# Patient Record
Sex: FEMALE | Race: WHITE | URBAN - METROPOLITAN AREA
[De-identification: names, ages, dates, MRNs, and addresses within clinical notes are randomized per-mention and may not be internally consistent; named-entity substitution may affect disease eponyms.]

---

## 2024-04-19 ENCOUNTER — TELEPHONE (OUTPATIENT)
Age: 59
End: 2024-04-19

## 2024-04-19 NOTE — TELEPHONE ENCOUNTER
Patient call to see if referral was received advise we had not received an referral and she may want to reach back out to the referring provider's office to have information faxed over pt verbally understood. Sdh

## 2024-05-13 ENCOUNTER — TELEPHONE (OUTPATIENT)
Age: 59
End: 2024-05-13

## 2024-05-13 NOTE — TELEPHONE ENCOUNTER
Unable to contact patient to schedule NP saad with NP elizabeth Garcia to schedule next available NP saad

## 2024-09-15 ASSESSMENT — RHEUMATOLOGY NEW PATIENT QUESTIONNAIRE
SKIN REDNESS: N
RASH: N
ANEMIA: N
EASILY LOSING TEMPER: N
DIFFICULTY BREATHING LYING DOWN: N
PERSISTENT DIARRHEA: N
LOSS OF CONSCIOUSNESS: N
ANXIETY: N
SORES IN MOUTH OR NOSE: N
NUMBNESS OR TINGLING IN HANDS OR FEET: Y
BLACK STOOLS: N
JOINT PAIN: Y
ABNORMAL URINE: N
JAUNDICE: N
HEARTBURN OR REFLUX: Y
SHORTNESS OF BREATH: Y
EYE REDNESS: N
FAINTING: N
FEVER: N
DIFFICULTY STAYING ASLEEP: Y
VAGINAL DRYNESS: N
BEHAVIORAL CHANGES: N
SWOLLEN OR TENDER GLANDS: N
UNUSUAL BLEEDING: N
SKIN TIGHTNESS: N
UNUSUAL FATIGUE: N
HOARSE VOICE: N
UNEXPLAINED HEARING LOSS: N
DRYNESS OF MOUTH: N
DEPRESSION: N
LOSS OF VISION: N
DIFFICULTY FALLING ASLEEP: Y
NODULES/BUMPS: N
HEADACHES: N
MUSCLE WEAKNESS: N
STOMACH PAIN: N
EXCESSIVE HAIR LOSS (MORE THAN YOUR NORM): N
MORNING STIFFNESS: N
EYE PAIN: N
UNUSUALLY RAPID OR SLOWED HEART RATE: N
EYE DRYNESS: N
AGITATION: N
SEIZURES: N
SUN SENSITIVE (SUN ALLERGY): Y
JOINT SWELLING: N
RASH OR ULCERS: N
BLOOD IN STOOLS: N
NAUSEA: N
COUGHING OF BLOOD: N
DOUBLE OR BLURRED VISION: N
UNEXPLAINED WEIGHT CHANGE: N
SWOLLEN LEGS OR FEET: Y
NIGHT SWEATS: N
EASY BRUISING: Y
COUGH: Y
INCREASED SUSCEPTIBILITY TO INFECTION: N
VOMITING OF BLOOD OR COFFEE GROUND CONSISTENCY MATERIAL: N
WHEEZING: N
MEMORY LOSS: N
CHEST PAIN: N
COLOR CHANGES OF HANDS OR FEET IN THE COLD: N
DIFFICULTY SWALLOWING: Y
PAIN OR BURNING ON URINATION: N

## 2024-09-16 ENCOUNTER — OFFICE VISIT (OUTPATIENT)
Age: 59
End: 2024-09-16
Payer: COMMERCIAL

## 2024-09-16 VITALS
RESPIRATION RATE: 16 BRPM | WEIGHT: 189 LBS | HEART RATE: 71 BPM | TEMPERATURE: 98.3 F | SYSTOLIC BLOOD PRESSURE: 121 MMHG | DIASTOLIC BLOOD PRESSURE: 72 MMHG | OXYGEN SATURATION: 96 %

## 2024-09-16 DIAGNOSIS — R76.8 POSITIVE ANA (ANTINUCLEAR ANTIBODY): Primary | ICD-10-CM

## 2024-09-16 DIAGNOSIS — R76.8 SS-A ANTIBODY POSITIVE: ICD-10-CM

## 2024-09-16 DIAGNOSIS — R76.8 POSITIVE ANA (ANTINUCLEAR ANTIBODY): ICD-10-CM

## 2024-09-16 PROCEDURE — 99204 OFFICE O/P NEW MOD 45 MIN: CPT | Performed by: NURSE PRACTITIONER

## 2024-09-16 ASSESSMENT — PATIENT HEALTH QUESTIONNAIRE - PHQ9
SUM OF ALL RESPONSES TO PHQ QUESTIONS 1-9: 0
SUM OF ALL RESPONSES TO PHQ QUESTIONS 1-9: 0
2. FEELING DOWN, DEPRESSED OR HOPELESS: NOT AT ALL
SUM OF ALL RESPONSES TO PHQ9 QUESTIONS 1 & 2: 0
SUM OF ALL RESPONSES TO PHQ QUESTIONS 1-9: 0
1. LITTLE INTEREST OR PLEASURE IN DOING THINGS: NOT AT ALL
SUM OF ALL RESPONSES TO PHQ QUESTIONS 1-9: 0

## 2024-09-16 ASSESSMENT — ENCOUNTER SYMPTOMS
BLOOD IN STOOL: 0
EYE PAIN: 0
ABDOMINAL PAIN: 0
DIARRHEA: 0
TROUBLE SWALLOWING: 1
SORE THROAT: 0
VOMITING: 0
EYE REDNESS: 0
CONSTIPATION: 0
COLOR CHANGE: 0
NAUSEA: 0
SHORTNESS OF BREATH: 1
COUGH: 1

## 2024-09-17 LAB
ALBUMIN SERPL-MCNC: 4.5 G/DL (ref 3.8–4.9)
ALP SERPL-CCNC: 118 IU/L (ref 44–121)
ALT SERPL-CCNC: 19 IU/L (ref 0–32)
AST SERPL-CCNC: 18 IU/L (ref 0–40)
BASOPHILS # BLD AUTO: 0.1 X10E3/UL (ref 0–0.2)
BASOPHILS NFR BLD AUTO: 1 %
BILIRUB SERPL-MCNC: 0.5 MG/DL (ref 0–1.2)
BUN SERPL-MCNC: 10 MG/DL (ref 6–24)
BUN/CREAT SERPL: 17 (ref 9–23)
CALCIUM SERPL-MCNC: 9.4 MG/DL (ref 8.7–10.2)
CHLORIDE SERPL-SCNC: 98 MMOL/L (ref 96–106)
CO2 SERPL-SCNC: 24 MMOL/L (ref 20–29)
CREAT SERPL-MCNC: 0.6 MG/DL (ref 0.57–1)
CREAT UR-MCNC: 47.7 MG/DL
EGFRCR SERPLBLD CKD-EPI 2021: 105 ML/MIN/1.73
EOSINOPHIL # BLD AUTO: 0 X10E3/UL (ref 0–0.4)
EOSINOPHIL NFR BLD AUTO: 1 %
ERYTHROCYTE [DISTWIDTH] IN BLOOD BY AUTOMATED COUNT: 12.5 % (ref 11.7–15.4)
ERYTHROCYTE [SEDIMENTATION RATE] IN BLOOD BY WESTERGREN METHOD: 23 MM/HR (ref 0–40)
GLOBULIN SER CALC-MCNC: 2.7 G/DL (ref 1.5–4.5)
GLUCOSE SERPL-MCNC: 89 MG/DL (ref 70–99)
HAV IGM SERPL QL IA: NEGATIVE
HBV CORE IGM SERPL QL IA: NEGATIVE
HBV SURFACE AG SERPL QL IA: NEGATIVE
HCT VFR BLD AUTO: 40.4 % (ref 34–46.6)
HCV AB SERPL QL IA: NORMAL
HCV IGG SERPL QL IA: NON REACTIVE
HGB BLD-MCNC: 13.9 G/DL (ref 11.1–15.9)
IMM GRANULOCYTES # BLD AUTO: 0 X10E3/UL (ref 0–0.1)
IMM GRANULOCYTES NFR BLD AUTO: 0 %
LYMPHOCYTES # BLD AUTO: 2.2 X10E3/UL (ref 0.7–3.1)
LYMPHOCYTES NFR BLD AUTO: 33 %
MCH RBC QN AUTO: 30.2 PG (ref 26.6–33)
MCHC RBC AUTO-ENTMCNC: 34.4 G/DL (ref 31.5–35.7)
MCV RBC AUTO: 88 FL (ref 79–97)
MONOCYTES # BLD AUTO: 0.5 X10E3/UL (ref 0.1–0.9)
MONOCYTES NFR BLD AUTO: 7 %
NEUTROPHILS # BLD AUTO: 4 X10E3/UL (ref 1.4–7)
NEUTROPHILS NFR BLD AUTO: 58 %
PLATELET # BLD AUTO: 342 X10E3/UL (ref 150–450)
POTASSIUM SERPL-SCNC: 4.6 MMOL/L (ref 3.5–5.2)
PROT SERPL-MCNC: 7.2 G/DL (ref 6–8.5)
PROT UR-MCNC: 7.2 MG/DL
PROT/CREAT UR: 151 MG/G CREAT (ref 0–200)
RBC # BLD AUTO: 4.61 X10E6/UL (ref 3.77–5.28)
SODIUM SERPL-SCNC: 136 MMOL/L (ref 134–144)
WBC # BLD AUTO: 6.8 X10E3/UL (ref 3.4–10.8)

## 2024-09-18 LAB
C3 SERPL-MCNC: 168 MG/DL (ref 82–167)
C4 SERPL-MCNC: 32 MG/DL (ref 12–38)
CRP SERPL-MCNC: 5 MG/L (ref 0–10)
ENA RNP AB SER-ACNC: 0.3 AI (ref 0–0.9)
ENA SM AB SER-ACNC: <0.2 AI (ref 0–0.9)
ENA SS-A AB SER-ACNC: <0.2 AI (ref 0–0.9)
ENA SS-B AB SER-ACNC: <0.2 AI (ref 0–0.9)
G6PD BLD QN: 236 U/10E12 RBC (ref 127–427)

## 2024-09-19 LAB
ANA SER QL IF: POSITIVE
ANA SPECKLED TITR SER: ABNORMAL {TITER}
GAMMA INTERFERON BACKGROUND BLD IA-ACNC: 0.04 IU/ML
Lab: ABNORMAL
M TB IFN-G BLD-IMP: NEGATIVE
M TB IFN-G CD4+ BCKGRND COR BLD-ACNC: 0.06 IU/ML
M TB IFN-G CD4+CD8+ BCKGRND COR BLD-ACNC: 0.06 IU/ML
MITOGEN IGNF BCKGRD COR BLD-ACNC: >10 IU/ML
SERVICE CMNT-IMP: NORMAL

## 2024-09-21 LAB
REF LAB TEST METHOD: NORMAL
TPMT GENE PROD MET ACT IMP BLD/T-IMP: NORMAL
TPMT RBC-CCNC: 20.9 UNITS/ML RBC

## 2024-09-22 LAB
DSDNA AB SER FARR-ACNC: <8 IU/ML
RHEUMATOID FACT SERPL-ACNC: <14 IU/ML

## 2024-09-27 NOTE — PATIENT INSTRUCTIONS
Thank you for visiting [facility]!      For future medication refills, we require updated lab results and an upcoming appointment to be in our system to refill prescriptions.  Without these two things, your refill will be DENIED.  If you miss your upcoming appointment, your refill will also be DENIED.      We appreciate your understanding of this critical clinical aspect of our practice. -Dr. Nichole & Zakia, NP      Start Cellcept 500mg twice a day with food. Take one with breakfast for 1 week then increase to one with breakfast and one with dinner.     Return to the clinic in 1 month.

## 2024-09-30 ENCOUNTER — OFFICE VISIT (OUTPATIENT)
Age: 59
End: 2024-09-30
Payer: COMMERCIAL

## 2024-09-30 VITALS
DIASTOLIC BLOOD PRESSURE: 80 MMHG | OXYGEN SATURATION: 96 % | WEIGHT: 187 LBS | RESPIRATION RATE: 16 BRPM | SYSTOLIC BLOOD PRESSURE: 120 MMHG | HEART RATE: 72 BPM | TEMPERATURE: 97.8 F

## 2024-09-30 DIAGNOSIS — J84.9 ILD (INTERSTITIAL LUNG DISEASE) (HCC): ICD-10-CM

## 2024-09-30 DIAGNOSIS — R76.8 POSITIVE ANA (ANTINUCLEAR ANTIBODY): Primary | ICD-10-CM

## 2024-09-30 PROCEDURE — 99214 OFFICE O/P EST MOD 30 MIN: CPT | Performed by: NURSE PRACTITIONER

## 2024-09-30 RX ORDER — MYCOPHENOLATE MOFETIL 500 MG/1
500 TABLET ORAL 2 TIMES DAILY
Qty: 60 TABLET | Refills: 3 | Status: SHIPPED | OUTPATIENT
Start: 2024-09-30

## 2024-09-30 ASSESSMENT — ENCOUNTER SYMPTOMS
EYE PAIN: 0
COUGH: 1
DIARRHEA: 0
COLOR CHANGE: 0
BLOOD IN STOOL: 0
SHORTNESS OF BREATH: 0
EYE REDNESS: 0
CONSTIPATION: 0
SORE THROAT: 0
TROUBLE SWALLOWING: 1
VOMITING: 0
ABDOMINAL PAIN: 0
NAUSEA: 0

## 2024-09-30 ASSESSMENT — PATIENT HEALTH QUESTIONNAIRE - PHQ9
SUM OF ALL RESPONSES TO PHQ QUESTIONS 1-9: 0
SUM OF ALL RESPONSES TO PHQ9 QUESTIONS 1 & 2: 0
2. FEELING DOWN, DEPRESSED OR HOPELESS: NOT AT ALL
1. LITTLE INTEREST OR PLEASURE IN DOING THINGS: NOT AT ALL
SUM OF ALL RESPONSES TO PHQ QUESTIONS 1-9: 0

## 2024-09-30 NOTE — PROGRESS NOTES
Iris Andersen (:  1965) is a 59 y.o. female    2024 Quantiferon gold neg, Hep panel nl  2024 MITESH 1:320 Speckled, RNP 0.3, Molina <0.2, SSA <0.2, SSB <0.2, DsDNA <8, TPMT nl, G6PD nl  3/8/2024 Myomarker 3 panel nl except SS-A 52kD ab IgG 47 (H),  ANCAs nl, Radx5 Profile nl, ACE 35, Niecy 1 <0.2, Scl 70 <0.2, Centromere <0.2, CCP 10, CK 73, Aldolase 3.8, MITESH 1:640 Speckled,     Subjective   Patient is a 59 year old female here for a follow up visit for a positive MITESH in the setting of ILD. She is here with her sister. We reviewed her lab results. She reports a dry cough and denies shortness of breath. She denies joint pain and swelling. She denies muscle pain and weakness. She denies sicca symptoms. She infections or antibiotics since her last visit.     Review of Systems   Constitutional:  Negative for chills and fever.   HENT:  Positive for trouble swallowing (sometimes she feels like food gets caught and she's scheduled for an endoscopy in November.). Negative for mouth sores and sore throat.         Denies nasals sores  Denies dry mouth   Eyes:  Negative for pain and redness.        Denies dry eyes   Respiratory:  Positive for cough (dry). Negative for shortness of breath.    Cardiovascular:  Negative for chest pain.   Gastrointestinal:  Negative for abdominal pain, blood in stool, constipation, diarrhea, nausea and vomiting.        Denies dark, stools   Genitourinary:  Negative for dysuria and hematuria.   Musculoskeletal:  Negative for arthralgias and joint swelling.   Skin:  Negative for color change and rash.     No current outpatient medications on file.     No current facility-administered medications for this visit.     Allergies   Allergen Reactions    Sulfa Antibiotics     Cephalexin Rash     Past Medical History:   Diagnosis Date    Interstitial lung disease (HCC)      Social History       Tobacco History       Smoking Status  Never      Smokeless Tobacco Use  Never

## 2024-10-03 LAB — ENA SCL70 AB SER QL IA: <20 UNITS

## 2025-08-11 ENCOUNTER — TRANSCRIBE ORDERS (OUTPATIENT)
Facility: HOSPITAL | Age: 60
End: 2025-08-11

## 2025-08-11 DIAGNOSIS — J84.9 INTERSTITIAL LUNG DISORDERS (HCC): Primary | ICD-10-CM

## 2025-08-28 ENCOUNTER — HOSPITAL ENCOUNTER (OUTPATIENT)
Facility: HOSPITAL | Age: 60
Setting detail: RECURRING SERIES
Discharge: HOME | End: 2025-08-31
Payer: COMMERCIAL

## 2025-08-28 VITALS — WEIGHT: 192 LBS

## 2025-08-28 PROCEDURE — 94618 PULMONARY STRESS TESTING: CPT

## 2025-08-28 RX ORDER — OMEPRAZOLE 20 MG/1
40 CAPSULE, DELAYED RELEASE ORAL DAILY
COMMUNITY

## 2025-08-28 ASSESSMENT — PULMONARY FUNCTION TESTS
FVC (LITERS): 1.9
FEV1/FVC: 84
FEV1 (%PREDICTED): 67
FEV1/FVC: 84
FVC (LITERS): 1.9
FEV1/FVC: 84
FEV1 (%PREDICTED): 67
FVC (LITERS): 1.9
FEV1 (%PREDICTED): 67

## 2025-08-28 ASSESSMENT — PATIENT HEALTH QUESTIONNAIRE - PHQ9
1. LITTLE INTEREST OR PLEASURE IN DOING THINGS: NOT AT ALL
6. FEELING BAD ABOUT YOURSELF - OR THAT YOU ARE A FAILURE OR HAVE LET YOURSELF OR YOUR FAMILY DOWN: SEVERAL DAYS
7. TROUBLE CONCENTRATING ON THINGS, SUCH AS READING THE NEWSPAPER OR WATCHING TELEVISION: NOT AT ALL
SUM OF ALL RESPONSES TO PHQ QUESTIONS 1-9: 4
SUM OF ALL RESPONSES TO PHQ QUESTIONS 1-9: 4
3. TROUBLE FALLING OR STAYING ASLEEP: SEVERAL DAYS
SUM OF ALL RESPONSES TO PHQ QUESTIONS 1-9: 4
5. POOR APPETITE OR OVEREATING: MORE THAN HALF THE DAYS
8. MOVING OR SPEAKING SO SLOWLY THAT OTHER PEOPLE COULD HAVE NOTICED. OR THE OPPOSITE, BEING SO FIGETY OR RESTLESS THAT YOU HAVE BEEN MOVING AROUND A LOT MORE THAN USUAL: NOT AT ALL
SUM OF ALL RESPONSES TO PHQ QUESTIONS 1-9: 4
9. THOUGHTS THAT YOU WOULD BE BETTER OFF DEAD, OR OF HURTING YOURSELF: NOT AT ALL
10. IF YOU CHECKED OFF ANY PROBLEMS, HOW DIFFICULT HAVE THESE PROBLEMS MADE IT FOR YOU TO DO YOUR WORK, TAKE CARE OF THINGS AT HOME, OR GET ALONG WITH OTHER PEOPLE: NOT DIFFICULT AT ALL
4. FEELING TIRED OR HAVING LITTLE ENERGY: NOT AT ALL
2. FEELING DOWN, DEPRESSED OR HOPELESS: NOT AT ALL